# Patient Record
Sex: FEMALE | Race: WHITE | Employment: OTHER | URBAN - METROPOLITAN AREA
[De-identification: names, ages, dates, MRNs, and addresses within clinical notes are randomized per-mention and may not be internally consistent; named-entity substitution may affect disease eponyms.]

---

## 2017-05-17 PROCEDURE — 81003 URINALYSIS AUTO W/O SCOPE: CPT | Performed by: FAMILY MEDICINE

## 2017-10-04 ENCOUNTER — IMMUNIZATION (OUTPATIENT)
Dept: FAMILY MEDICINE CLINIC | Facility: CLINIC | Age: 62
End: 2017-10-04

## 2017-10-04 DIAGNOSIS — Z23 NEED FOR VACCINATION: ICD-10-CM

## 2017-10-04 PROCEDURE — 90471 IMMUNIZATION ADMIN: CPT | Performed by: NURSE PRACTITIONER

## 2017-10-04 PROCEDURE — 90686 IIV4 VACC NO PRSV 0.5 ML IM: CPT | Performed by: NURSE PRACTITIONER

## 2018-07-07 PROCEDURE — 81003 URINALYSIS AUTO W/O SCOPE: CPT | Performed by: FAMILY MEDICINE

## 2018-07-11 PROCEDURE — 88175 CYTOPATH C/V AUTO FLUID REDO: CPT | Performed by: FAMILY MEDICINE

## 2018-07-11 PROCEDURE — 87624 HPV HI-RISK TYP POOLED RSLT: CPT | Performed by: FAMILY MEDICINE

## 2019-03-30 ENCOUNTER — NURSE ONLY (OUTPATIENT)
Dept: FAMILY MEDICINE CLINIC | Facility: CLINIC | Age: 64
End: 2019-03-30
Payer: COMMERCIAL

## 2019-03-30 DIAGNOSIS — Z23 ENCOUNTER FOR IMMUNIZATION: Primary | ICD-10-CM

## 2019-03-30 PROCEDURE — 90715 TDAP VACCINE 7 YRS/> IM: CPT | Performed by: PHYSICIAN ASSISTANT

## 2019-03-30 PROCEDURE — 90471 IMMUNIZATION ADMIN: CPT | Performed by: PHYSICIAN ASSISTANT

## 2019-06-12 PROCEDURE — 86803 HEPATITIS C AB TEST: CPT | Performed by: FAMILY MEDICINE

## 2019-06-12 PROCEDURE — 36415 COLL VENOUS BLD VENIPUNCTURE: CPT | Performed by: FAMILY MEDICINE

## 2020-06-24 PROCEDURE — 88321 CONSLTJ&REPRT SLD PREP ELSWR: CPT | Performed by: INTERNAL MEDICINE

## 2020-06-24 PROCEDURE — 88305 TISSUE EXAM BY PATHOLOGIST: CPT | Performed by: INTERNAL MEDICINE

## 2020-10-30 PROBLEM — Z20.822 EXPOSURE TO COVID-19 VIRUS: Status: ACTIVE | Noted: 2020-10-30

## 2020-10-30 PROBLEM — Z11.3 ENCNTR SCREEN FOR INFECTIONS W SEXL MODE OF TRANSMISS: Status: ACTIVE | Noted: 2020-10-30

## 2020-11-04 ENCOUNTER — OFFICE VISIT (OUTPATIENT)
Dept: FAMILY MEDICINE CLINIC | Facility: CLINIC | Age: 65
End: 2020-11-04
Payer: MEDICARE

## 2020-11-04 VITALS
RESPIRATION RATE: 14 BRPM | DIASTOLIC BLOOD PRESSURE: 82 MMHG | HEART RATE: 62 BPM | TEMPERATURE: 98 F | BODY MASS INDEX: 20.49 KG/M2 | HEIGHT: 64 IN | SYSTOLIC BLOOD PRESSURE: 132 MMHG | WEIGHT: 120 LBS | OXYGEN SATURATION: 98 %

## 2020-11-04 DIAGNOSIS — Z20.822 CLOSE EXPOSURE TO COVID-19 VIRUS: ICD-10-CM

## 2020-11-04 DIAGNOSIS — R09.81 CONGESTION OF NASAL SINUS: Primary | ICD-10-CM

## 2020-11-04 PROCEDURE — 99213 OFFICE O/P EST LOW 20 MIN: CPT | Performed by: NURSE PRACTITIONER

## 2020-11-04 NOTE — PROGRESS NOTES
CHIEF COMPLAINT:   Patient presents with:  Covid: exposed 5 days ago, sinus, congested and sore throat. HPI:   Lakhwinder Haynes is a 72year old female who presents for upper respiratory symptoms for 5 days.    Patient reports: sinus symptoms, conge Smokeless tobacco: Never Used    Alcohol use: Yes      Comment: 1 WINE WEEK    Drug use: No        REVIEW OF SYSTEMS:   GENERAL: OK appetite  SKIN: see HPI  HEENT: See HPI  LUNGS: See HPI  CARDIOVASCULAR: denies palpitations; see HPI  GI: see HPI  NEUR Pt with known COVID exposure; advised a negative COVID test does not guarantee pt is not infectious or contagious and does not change need to quarantine for 14 days after last exposure.        Meds & Refills for this Visit:  Requested Prescriptions      No Patients with pending COVID-19 test results should follow all care and home isolation instructions. Your test results will be called to you from an [facility] representative.  If you have not received a call within 2 business days, please call your pr If you are awaiting test results or are confirmed positive for COVID -19, and your symptoms worsen at home with symptoms such as: extreme weakness, difficult breathing, or unrelenting fevers greater than 100.4 degrees Fahrenheit, you should contact your he [facility], in conjunction with [facility], is looking for patients who have recovered from COVID-19 and would be interested in donating plasma.     Convalescent plasma is a component of blood that, in people who have recovered from COVID-19, [email or web address]://www.hoskins.com/  https://www.cdc.gov/coronavirus/2019-ncov/        The patient indicates understanding of these issues and agrees to the plan.

## 2020-11-04 NOTE — PATIENT INSTRUCTIONS
Covid test sent;   Results 2-3 days    Continue to 14 days quarantine     Follow-up if not improving          Coronavirus Disease 2019 (COVID-19)     [facility] is committed to the safety and well-being of our patients, members, employees, and c symptoms get worse, call your healthcare provider immediately. 3. Get rest and stay hydrated.    4. If you have a medical appointment, call the healthcare provider ahead of time and tell them that you have or may have COVID-19.  5. For medical emergencies, medications; and  · Improvement in respiratory symptoms (e.g., cough, shortness of breath); and  · At least 10 days have passed since symptoms first appeared OR if asymptomatic patient or date of symptom onset is unclear then use 10 days post COVID test da must:    · Have had a confirmed diagnosis of COVID-19  · Be symptom-free for at least 14 days*    *Some people will be required to have a repeat COVID-19 test in order to be eligible to donate.  If you’re instructed by Floyd Castaneda that a repeat test is required

## 2021-02-17 PROBLEM — Z11.3 ENCNTR SCREEN FOR INFECTIONS W SEXL MODE OF TRANSMISS: Status: RESOLVED | Noted: 2020-10-30 | Resolved: 2021-02-17

## 2021-02-17 PROBLEM — Z20.822 EXPOSURE TO COVID-19 VIRUS: Status: RESOLVED | Noted: 2020-10-30 | Resolved: 2021-02-17

## 2021-10-12 ENCOUNTER — HOSPITAL ENCOUNTER (INPATIENT)
Facility: HOSPITAL | Age: 66
LOS: 1 days | Discharge: HOME | DRG: 603 | End: 2021-10-14
Attending: EMERGENCY MEDICINE | Admitting: INTERNAL MEDICINE
Payer: MEDICARE

## 2021-10-12 ENCOUNTER — APPOINTMENT (OUTPATIENT)
Dept: GENERAL RADIOLOGY | Facility: HOSPITAL | Age: 66
DRG: 603 | End: 2021-10-12
Attending: EMERGENCY MEDICINE
Payer: MEDICARE

## 2021-10-12 DIAGNOSIS — L03.113 CELLULITIS OF RIGHT UPPER EXTREMITY: Primary | ICD-10-CM
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
## 2021-10-12 PROCEDURE — 73130 X-RAY EXAM OF HAND: CPT | Performed by: EMERGENCY MEDICINE

## 2021-10-12 PROCEDURE — 99285 EMERGENCY DEPT VISIT HI MDM: CPT

## 2021-10-12 PROCEDURE — 36415 COLL VENOUS BLD VENIPUNCTURE: CPT

## 2021-10-13 PROBLEM — L03.91 ACUTE LYMPHANGITIS: Status: ACTIVE | Noted: 2021-10-13
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
## 2021-10-13 PROCEDURE — 80053 COMPREHEN METABOLIC PANEL: CPT | Performed by: EMERGENCY MEDICINE

## 2021-10-13 PROCEDURE — 85025 COMPLETE CBC W/AUTO DIFF WBC: CPT | Performed by: EMERGENCY MEDICINE

## 2021-10-13 PROCEDURE — 85025 COMPLETE CBC W/AUTO DIFF WBC: CPT | Performed by: HOSPITALIST

## 2021-10-13 PROCEDURE — 84132 ASSAY OF SERUM POTASSIUM: CPT | Performed by: INTERNAL MEDICINE

## 2021-10-13 PROCEDURE — 84145 PROCALCITONIN (PCT): CPT | Performed by: HOSPITALIST

## 2021-10-13 PROCEDURE — 85027 COMPLETE CBC AUTOMATED: CPT | Performed by: HOSPITALIST

## 2021-10-13 PROCEDURE — 80048 BASIC METABOLIC PNL TOTAL CA: CPT | Performed by: EMERGENCY MEDICINE

## 2021-10-13 PROCEDURE — 85007 BL SMEAR W/DIFF WBC COUNT: CPT | Performed by: HOSPITALIST

## 2021-10-13 PROCEDURE — 87040 BLOOD CULTURE FOR BACTERIA: CPT | Performed by: EMERGENCY MEDICINE

## 2021-10-13 PROCEDURE — S0077 INJECTION, CLINDAMYCIN PHOSP: HCPCS | Performed by: HOSPITALIST

## 2021-10-13 PROCEDURE — 85027 COMPLETE CBC AUTOMATED: CPT | Performed by: EMERGENCY MEDICINE

## 2021-10-13 PROCEDURE — 85007 BL SMEAR W/DIFF WBC COUNT: CPT | Performed by: EMERGENCY MEDICINE

## 2021-10-13 RX ORDER — BISACODYL 10 MG
10 SUPPOSITORY, RECTAL RECTAL
Status: DISCONTINUED | OUTPATIENT
Start: 2021-10-13 | End: 2021-10-14

## 2021-10-13 RX ORDER — ENOXAPARIN SODIUM 100 MG/ML
40 INJECTION SUBCUTANEOUS DAILY
Status: DISCONTINUED | OUTPATIENT
Start: 2021-10-13 | End: 2021-10-14

## 2021-10-13 RX ORDER — SODIUM PHOSPHATE, DIBASIC AND SODIUM PHOSPHATE, MONOBASIC 7; 19 G/133ML; G/133ML
1 ENEMA RECTAL ONCE AS NEEDED
Status: DISCONTINUED | OUTPATIENT
Start: 2021-10-13 | End: 2021-10-14

## 2021-10-13 RX ORDER — POLYETHYLENE GLYCOL 3350 17 G/17G
17 POWDER, FOR SOLUTION ORAL DAILY PRN
Status: DISCONTINUED | OUTPATIENT
Start: 2021-10-13 | End: 2021-10-14

## 2021-10-13 RX ORDER — ACETAMINOPHEN 325 MG/1
650 TABLET ORAL EVERY 6 HOURS PRN
Status: DISCONTINUED | OUTPATIENT
Start: 2021-10-13 | End: 2021-10-14

## 2021-10-13 RX ORDER — CLINDAMYCIN PHOSPHATE 600 MG/50ML
600 INJECTION INTRAVENOUS EVERY 8 HOURS
Status: DISCONTINUED | OUTPATIENT
Start: 2021-10-13 | End: 2021-10-13

## 2021-10-13 RX ORDER — POTASSIUM CHLORIDE 20 MEQ/1
40 TABLET, EXTENDED RELEASE ORAL EVERY 4 HOURS
Status: COMPLETED | OUTPATIENT
Start: 2021-10-13 | End: 2021-10-13

## 2021-10-13 RX ORDER — PANTOPRAZOLE SODIUM 40 MG/1
40 TABLET, DELAYED RELEASE ORAL
Status: DISCONTINUED | OUTPATIENT
Start: 2021-10-13 | End: 2021-10-14

## 2021-10-13 RX ORDER — METOCLOPRAMIDE HYDROCHLORIDE 5 MG/ML
10 INJECTION INTRAMUSCULAR; INTRAVENOUS EVERY 8 HOURS PRN
Status: DISCONTINUED | OUTPATIENT
Start: 2021-10-13 | End: 2021-10-14

## 2021-10-13 RX ORDER — SODIUM CHLORIDE 9 MG/ML
INJECTION, SOLUTION INTRAVENOUS CONTINUOUS
Status: DISCONTINUED | OUTPATIENT
Start: 2021-10-13 | End: 2021-10-14

## 2021-10-13 RX ORDER — DOCUSATE SODIUM 100 MG/1
100 CAPSULE, LIQUID FILLED ORAL 2 TIMES DAILY
Status: DISCONTINUED | OUTPATIENT
Start: 2021-10-13 | End: 2021-10-14

## 2021-10-13 RX ORDER — ACETAMINOPHEN 325 MG/1
650 TABLET ORAL EVERY 4 HOURS PRN
Status: DISCONTINUED | OUTPATIENT
Start: 2021-10-13 | End: 2021-10-14

## 2021-10-13 RX ORDER — ONDANSETRON 2 MG/ML
4 INJECTION INTRAMUSCULAR; INTRAVENOUS EVERY 6 HOURS PRN
Status: DISCONTINUED | OUTPATIENT
Start: 2021-10-13 | End: 2021-10-14

## 2021-10-13 RX ORDER — HYDROCODONE BITARTRATE AND ACETAMINOPHEN 5; 325 MG/1; MG/1
2 TABLET ORAL EVERY 4 HOURS PRN
Status: DISCONTINUED | OUTPATIENT
Start: 2021-10-13 | End: 2021-10-14

## 2021-10-13 RX ORDER — MELATONIN
3 NIGHTLY PRN
Status: DISCONTINUED | OUTPATIENT
Start: 2021-10-13 | End: 2021-10-14

## 2021-10-13 RX ORDER — HYDROCODONE BITARTRATE AND ACETAMINOPHEN 5; 325 MG/1; MG/1
1 TABLET ORAL EVERY 4 HOURS PRN
Status: DISCONTINUED | OUTPATIENT
Start: 2021-10-13 | End: 2021-10-14

## 2021-10-13 NOTE — PLAN OF CARE
Patient alert and oriented , up ad em in room . Right hand red and swollen warm to touch ,redness is going up to the upper arm ,patient states she can feel the pain in her lymph node . Small open area to the rt hand covered with dry gauze .  Ivf started ,i

## 2021-10-13 NOTE — ED INITIAL ASSESSMENT (HPI)
Pt was scratched by a cat yesterday in her right arm around late afternoon. Pt arrives today with a red and swollen right forearm and wrist. Pt was seen at the urgent care who sent her here.

## 2021-10-13 NOTE — PLAN OF CARE
Right hand/arm remains red and swollen. Arm elevated on pillow, on iv antibiotic. Rating pain to hand 4 out of 10 and headache 6, norco given. Patient states she normally drinks 2-3 cups of tea per day, possible caffeine withdrawal headache?  Patient given

## 2021-10-13 NOTE — H&P
[facility] Hospitalist History and Physical      Patient presents with:  Cellulitis       PCP: Erum Montalvo DO      History of Present Illness: Patient is a 77year old female with PMH sig for Kearns's esophagus who presented to the ED last ni Cap, Take 5,000 Units by mouth daily. , Disp: , Rfl:   Calcium Carbonate 600 MG Oral Tab, 600 mg daily. , Disp: , Rfl:   Probiotic Product (PROBIOTIC DAILY OR), Take by mouth., Disp: , Rfl:   ALENDRONATE 70 MG Oral Tab, TAKE 1 TABLET BY MOUTH ONCE A WEEK, Hanh Dent ALT 23 10/13/2021       CXR: image personally reviewed. Radiology: XR HAND (MIN 3 VIEWS), RIGHT (CPT=73130)    Result Date: 10/13/2021  PROCEDURE:  XR HAND (MIN 3 VIEWS), RIGHT (CPT=73130)  TECHNIQUE:  Three views were obtained. COMPARISON:  None. on patients current state of illness, I anticipate that, after discharge, patient will require TBD.

## 2021-10-13 NOTE — CONSULTS
INFECTIOUS DISEASE CONSULT NOTE    José Miguel Henry Patient Status:  Inpatient    1955 MRN [id number]   [facility] 3SW-A Attending Delia Bergman,    Hosp Day # 0 PCP [facility] Subcutaneous, Daily  •  acetaminophen (TYLENOL) tab 650 mg, 650 mg, Oral, Q6H PRN  •  acetaminophen (TYLENOL) tab 650 mg, 650 mg, Oral, Q4H PRN **OR** HYDROcodone-acetaminophen (NORCO) 5-325 MG per tab 1 tablet, 1 tablet, Oral, Q4H PRN **OR** HYDROcodone-a temperature 98.9 °F (37.2 °C), temperature source Oral, resp. rate 18, height 5' 4\" (1.626 m), weight 125 lb (56.7 kg), last menstrual period 01/01/1995, SpO2 96 %. HEENT: no scleral icterus or conjunctival injection. Moist mucous membranes.  No thrush  N antibiotics at immediate care and sent for admission. PATIENT STATED HISTORY: (As transcribed by Technologist)  Patient states she is a  and was scratched by a cat yesterday to the right hand and today hand is very red and swollen.     FINDINGS

## 2021-10-13 NOTE — PLAN OF CARE
K+ 3.6 this morning, I will replace per electrolyte protocol and K+ blood test to recheck will be at 17:30.

## 2021-10-14 VITALS
HEIGHT: 64 IN | SYSTOLIC BLOOD PRESSURE: 108 MMHG | OXYGEN SATURATION: 96 % | TEMPERATURE: 99 F | DIASTOLIC BLOOD PRESSURE: 71 MMHG | WEIGHT: 125 LBS | HEART RATE: 79 BPM | RESPIRATION RATE: 18 BRPM | BODY MASS INDEX: 21.34 KG/M2

## 2021-10-14 PROCEDURE — 85025 COMPLETE CBC W/AUTO DIFF WBC: CPT | Performed by: INTERNAL MEDICINE

## 2021-10-14 PROCEDURE — 80048 BASIC METABOLIC PNL TOTAL CA: CPT | Performed by: INTERNAL MEDICINE

## 2021-10-14 RX ORDER — AMOXICILLIN AND CLAVULANATE POTASSIUM 875; 125 MG/1; MG/1
1 TABLET, FILM COATED ORAL 2 TIMES DAILY
Qty: 20 TABLET | Refills: 0 | Status: SHIPPED | OUTPATIENT
Start: 2021-10-14 | End: 2021-10-24

## 2021-10-14 RX ORDER — POTASSIUM CHLORIDE 20 MEQ/1
40 TABLET, EXTENDED RELEASE ORAL ONCE
Status: COMPLETED | OUTPATIENT
Start: 2021-10-14 | End: 2021-10-14

## 2021-10-14 NOTE — PROGRESS NOTES
INFECTIOUS DISEASE PROGRESS NOTE    Lety Carrion Patient Status:  Inpatient    1955 MRN [id number]   [facility] 3SW-A Attending Sid Aguilar,    Hosp Day # 1 PCP [facility] temperature source Oral, resp. rate 18, height 5' 4\" (1.626 m), weight 125 lb (56.7 kg), last menstrual period 01/01/1995, SpO2 96 %. HEENT: no scleral icterus or conjunctival injection. Moist mucous membranes. No thrush  Neck: No lymphadenopathy.   Suppl UROBILINOGEN, NITRITE, LEUUR, WBCUR, RBCUR, BACUR, EPIUR in the last 168 hours.      Microbiology    Reviewed in EMR,     Radiology: XR HAND (MIN 3 VIEWS), RIGHT (CPT=73130)    Result Date: 10/13/2021  PROCEDURE:  XR HAND (MIN 3 VIEWS), RIGHT (CPT=73130)  T progress  -doubt deep infection or tendon involvement based on changes on exam. Would not consult ortho at this point. Case and plan d/w pt and RN.  Would dc on augmentin for 10 more days    Cornelius Alicea MD

## 2021-10-14 NOTE — DISCHARGE SUMMARY
[facility] Internal Medicine Hospitalist Discharge Summary     Patient ID:  Chuck Morfin  77year old  2/6/1955    Admit date: 10/12/2021    Discharge date and time: 10/14/2021    Attending Units by mouth daily. Calcium Carbonate 600 MG Oral Tab  600 mg daily. Probiotic Product (PROBIOTIC DAILY OR)  Take by mouth.     ALENDRONATE 70 MG Oral Tab  TAKE 1 TABLET BY MOUTH ONCE A WEEK          Activity: activity as tolerated  Diet: regular di

## 2021-10-14 NOTE — ED PROVIDER NOTES
Patient Seen in: [facility] 3sw-a      History   Patient presents with:  Cellulitis    Stated Complaint: cat scratch to right hand, swollen and red.  Had IV antibiotics at immediate car*    Subjective:   HPI    30-year-old female presents from the im antibiotics at immediate car*  Other systems are as noted in HPI. Constitutional and vital signs reviewed. All other systems reviewed and negative except as noted above.     Physical Exam     ED Triage Vitals [10/12/21 2219]   /65   Pulse 95   R following components:    Glucose 119 (*)     Sodium 133 (*)     Calcium, Total 7.1 (*)     Alkaline Phosphatase 47 (*)     Total Protein 5.9 (*)     Albumin 2.8 (*)     A/G Ratio 0.9 (*)     All other components within normal limits   PROCALCITONIN - Abnor -----------         ------                     CBC W/ DIFFERENTIAL[[id number]]          Abnormal            Final result                 Please view results for these tests on the individual orders.    SCAN SLIDE   CBC WITH DIFFERENTIAL W treatment.   Admission disposition: 10/12/2021 11:47 PM                                  Disposition and Plan     Clinical Impression:  Cellulitis of right upper extremity  (primary encounter diagnosis)  Cat scratch     Disposition:  Admit  10/12/2021 11:47

## 2021-10-14 NOTE — PLAN OF CARE
Patient alert, O x 4. Right hand appears slightly less red than yesterday, swelling remains the same. Has low grade temp 99.4, IS encouraged. On iv antibiotics. Patient removed gauze wrap to right hand, no drainage now, coverlet applied.

## 2021-10-14 NOTE — PLAN OF CARE
Gauze/kerlix dsg to rt hand cdi. Swelling and redness remains to hand, arm kept elevated. States has throbbing pain at times, declined need for pain medication when offered. Afebrile, tolerating Unasyn IV. DC home when ready.

## 2021-10-14 NOTE — PLAN OF CARE
Patient received full dose of antibiotic iv. Written and verbal discharge instructions given to patient. She verbalizes understanding. Patient discharged home via wheelchair with friend in stable condition.

## 2021-10-27 PROBLEM — M81.0 OSTEOPOROSIS: Status: ACTIVE | Noted: 2020-02-20

## 2023-10-23 RX ORDER — FAMOTIDINE 20 MG/1
20 TABLET, FILM COATED ORAL DAILY
COMMUNITY

## 2023-11-14 ENCOUNTER — ANESTHESIA EVENT (OUTPATIENT)
Dept: ENDOSCOPY | Facility: HOSPITAL | Age: 68
End: 2023-11-14
Payer: MEDICARE

## 2023-11-15 ENCOUNTER — HOSPITAL ENCOUNTER (OUTPATIENT)
Facility: HOSPITAL | Age: 68
Setting detail: HOSPITAL OUTPATIENT SURGERY
Discharge: HOME | End: 2023-11-15
Attending: INTERNAL MEDICINE | Admitting: INTERNAL MEDICINE
Payer: MEDICARE

## 2023-11-15 ENCOUNTER — ANESTHESIA (OUTPATIENT)
Dept: ENDOSCOPY | Facility: HOSPITAL | Age: 68
End: 2023-11-15
Payer: MEDICARE

## 2023-11-15 VITALS
HEART RATE: 69 BPM | BODY MASS INDEX: 21.87 KG/M2 | DIASTOLIC BLOOD PRESSURE: 73 MMHG | RESPIRATION RATE: 16 BRPM | HEIGHT: 63.5 IN | OXYGEN SATURATION: 99 % | WEIGHT: 125 LBS | SYSTOLIC BLOOD PRESSURE: 119 MMHG | TEMPERATURE: 98 F

## 2023-11-15 PROCEDURE — 0D538ZZ DESTRUCTION OF LOWER ESOPHAGUS, VIA NATURAL OR ARTIFICIAL OPENING ENDOSCOPIC: ICD-10-PCS | Performed by: INTERNAL MEDICINE

## 2023-11-15 RX ORDER — SODIUM CHLORIDE, SODIUM LACTATE, POTASSIUM CHLORIDE, CALCIUM CHLORIDE 600; 310; 30; 20 MG/100ML; MG/100ML; MG/100ML; MG/100ML
INJECTION, SOLUTION INTRAVENOUS CONTINUOUS
Status: DISCONTINUED | OUTPATIENT
Start: 2023-11-15 | End: 2023-11-15

## 2023-11-15 RX ORDER — NALOXONE HYDROCHLORIDE 0.4 MG/ML
0.08 INJECTION, SOLUTION INTRAMUSCULAR; INTRAVENOUS; SUBCUTANEOUS ONCE AS NEEDED
Status: DISCONTINUED | OUTPATIENT
Start: 2023-11-15 | End: 2023-11-15

## 2023-11-15 RX ORDER — OMEPRAZOLE 20 MG/1
20 CAPSULE, DELAYED RELEASE ORAL
COMMUNITY

## 2023-11-15 RX ADMIN — SODIUM CHLORIDE, SODIUM LACTATE, POTASSIUM CHLORIDE, CALCIUM CHLORIDE: 600; 310; 30; 20 INJECTION, SOLUTION INTRAVENOUS at 11:00:00

## 2023-11-15 RX ADMIN — SODIUM CHLORIDE, SODIUM LACTATE, POTASSIUM CHLORIDE, CALCIUM CHLORIDE: 600; 310; 30; 20 INJECTION, SOLUTION INTRAVENOUS at 10:40:00

## 2023-11-15 NOTE — OPERATIVE REPORT
OPERATIVE REPORT   PATIENT NAME: Derrick Dewey  MRN: [id number]  DATE OF OPERATION: 11/15/2023  PREOPERATIVE DIAGNOSIS: Esquivel's esophagus with low grade dysplasia; chronic GERD  POSTOPERATIVE DIAGNOSIS:    1. Irregular Z line with small < 1cm tongues of Esquivel's at 6 o'clock and 3 o'clock position   2. Small 2cm Hill type II hiatal hernia   PROCEDURE PERFORMED: Esophagogastroduodenoscopy with david RFA #1/3  SEDATION MEDICATIONS: MAC  MODERATE SEDATION TIME: None. Deep sedation provided by anesthesia  PREPROCEDURE ASSESSMENT: The indication for this procedure is to assess for Esquivel's and RFA. The patient was identified by myself and nursing staff in the exam room. Informed consent was obtained. The patient was seen in clinic and a full H&P was obtained. On brief physical examination, airway is patent. Chest is clear. Heart has regular rate and rhythm. Abdomen is soft, nontender with good bowel sounds. A medication list was taken by nursing today and reviewed by myself. The patient is an ASA grade 2. PROCEDURE NOTE: The procedure was completed without difficulty. The patient tolerated the procedure well. The endoscope was inserted through the mouth and advanced to the level of the duodenum, 3rd portion. Esophagus appeared normal without stricture or esophagitis. Small hiatal hernia was noted. Irregular Z line with small tongues noted. Circumferential RFA with channel catheter was performed x 2 sessions. Good results were noted. Stomach was normal in the antrum and the body. Duodenum was normal in the bulb and 2nd duodenum. Retroflexed view in the stomach revealed normal fundus and cardia. There were no immediate complications. FINDINGS   Ultra-short esquivel's with LGD. S/p RFA x 1 of 3  RECOMMENDATIONS: repeat EGD in 2 months x 2 and then repeat EGD 3 months later for biopsies. Continue daily PPI. Carafate sent.     DISCHARGE:  On discharge, the patient was given an after-visit summary detailing the procedure, findings, followup plans, and an updated medication list.     Thank you very much for the consultation. I really appreciate it.     Bethanie Avila MD

## 2023-11-15 NOTE — DISCHARGE INSTRUCTIONS
Home Care Instructions for Gastroscopy with Sedation    Diet:  - Resume your regular diet as tolerated unless otherwise instructed. - Start with light meals to minimize bloating.  - Do not drink alcohol today. Medication:  - If you have questions about resuming your normal medications, please contact your Primary Care Physician. Activities:  - Take it easy today. Do not return to work today. - Do not drive today. - Do not operate any machinery today (including kitchen equipment). Gastroscopy:  - You may have a sore throat for 2-3 days following the exam. This is normal. Gargling with warm salt water (1/2 tsp salt to 1 glass warm water) or using throat lozenges will help. - If you experience any sharp pain in your neck, abdomen or chest, vomiting of blood, oral temperature over 100 degrees Fahrenheit, light-headedness or dizziness, or any other problems, contact your doctor. **If unable to reach your doctor, please go to the [facility] Emergency Room**    - Your referring physician will receive a full report of your examination.  - If you do not hear from your doctor's office within two weeks of your biopsy, please call them for your results. You may be able to see your laboratory results in [facility] between 4 and 7 business days. In some cases, your physician may not have viewed the results before they are released to [address] If you have questions regarding your results contact the physician who ordered the test/exam by phone or via [address] by choosing \"Ask a Medical Question. \"

## 2023-11-15 NOTE — ANESTHESIA POSTPROCEDURE EVALUATION
Sonia Maxwell 89 Patient Status:  Hospital Outpatient Surgery   Age/Gender 76year old female MRN [id number]   Location [address] [address] Attending Beverley Diop MD   Hosp Day # 0 PCP Aniket Raman DO       Anesthesia Post-op Note    ESOPHAGOGASTRODUODENOSCOPY (EGD) CHANNEL RADIO FREQUENCY ABLATION    Procedure Summary       Date: 11/15/23 Room / Location: [facility] ENDOSCOPY 03 / [facility] ENDOSCOPY    Anesthesia Start: 2951 Anesthesia Stop:     Procedure: ESOPHAGOGASTRODUODENOSCOPY (EGD) CHANNEL RADIO FREQUENCY ABLATION Diagnosis: (Kearns's, hiatal hernia)    Surgeons: Beverley Diop MD Anesthesiologist: Karl Richardson MD    Anesthesia Type: MAC ASA Status: 2            Anesthesia Type: MAC    Vitals Value Taken Time   /63 11/15/23 1100   Temp 98 11/15/23 1100   Pulse 73 11/15/23 1100   Resp 17 11/15/23 1100   SpO2 99 11/15/23 1100       Patient Location: Endoscopy    Anesthesia Type: MAC    Airway Patency: patent    Postop Pain Control: adequate    Mental Status: mildly sedated but able to meaningfully participate in the post-anesthesia evaluation    Nausea/Vomiting: none    Cardiopulmonary/Hydration status: stable euvolemic    Complications: no apparent anesthesia related complications    Postop vital signs: stable    Dental Exam: Unchanged from Preop    Patient to be discharged home when criteria met.

## 2024-01-10 ENCOUNTER — HOSPITAL ENCOUNTER (OUTPATIENT)
Facility: HOSPITAL | Age: 69
Setting detail: HOSPITAL OUTPATIENT SURGERY
Discharge: HOME | End: 2024-01-10
Attending: INTERNAL MEDICINE | Admitting: INTERNAL MEDICINE
Payer: MEDICARE

## 2024-01-10 ENCOUNTER — ANESTHESIA (OUTPATIENT)
Dept: ENDOSCOPY | Facility: HOSPITAL | Age: 69
End: 2024-01-10
Payer: MEDICARE

## 2024-01-10 ENCOUNTER — ANESTHESIA EVENT (OUTPATIENT)
Dept: ENDOSCOPY | Facility: HOSPITAL | Age: 69
End: 2024-01-10
Payer: MEDICARE

## 2024-01-10 VITALS
WEIGHT: 125 LBS | RESPIRATION RATE: 16 BRPM | OXYGEN SATURATION: 97 % | BODY MASS INDEX: 22.15 KG/M2 | DIASTOLIC BLOOD PRESSURE: 80 MMHG | HEIGHT: 63 IN | TEMPERATURE: 97 F | SYSTOLIC BLOOD PRESSURE: 119 MMHG | HEART RATE: 63 BPM

## 2024-01-10 PROCEDURE — 0D538ZZ DESTRUCTION OF LOWER ESOPHAGUS, VIA NATURAL OR ARTIFICIAL OPENING ENDOSCOPIC: ICD-10-PCS | Performed by: INTERNAL MEDICINE

## 2024-01-10 RX ORDER — SODIUM CHLORIDE, SODIUM LACTATE, POTASSIUM CHLORIDE, CALCIUM CHLORIDE 600; 310; 30; 20 MG/100ML; MG/100ML; MG/100ML; MG/100ML
INJECTION, SOLUTION INTRAVENOUS CONTINUOUS
Status: DISCONTINUED | OUTPATIENT
Start: 2024-01-10 | End: 2024-01-10

## 2024-01-10 RX ORDER — ONDANSETRON 2 MG/ML
4 INJECTION INTRAMUSCULAR; INTRAVENOUS ONCE AS NEEDED
Status: DISCONTINUED | OUTPATIENT
Start: 2024-01-10 | End: 2024-01-10

## 2024-01-10 RX ORDER — NALOXONE HYDROCHLORIDE 0.4 MG/ML
0.08 INJECTION, SOLUTION INTRAMUSCULAR; INTRAVENOUS; SUBCUTANEOUS ONCE AS NEEDED
Status: DISCONTINUED | OUTPATIENT
Start: 2024-01-10 | End: 2024-01-10

## 2024-01-10 RX ADMIN — SODIUM CHLORIDE, SODIUM LACTATE, POTASSIUM CHLORIDE, CALCIUM CHLORIDE: 600; 310; 30; 20 INJECTION, SOLUTION INTRAVENOUS at 07:19:00

## 2024-01-10 RX ADMIN — SODIUM CHLORIDE, SODIUM LACTATE, POTASSIUM CHLORIDE, CALCIUM CHLORIDE: 600; 310; 30; 20 INJECTION, SOLUTION INTRAVENOUS at 07:18:00

## 2024-01-10 NOTE — OPERATIVE REPORT
OPERATIVE REPORT   PATIENT NAME: Meagan Nolen  MRN: [id number]  DATE OF OPERATION: 1/10/2024  PREOPERATIVE DIAGNOSIS: Esquivel's esophagus with low grade dysplasia; chronic GERD  POSTOPERATIVE DIAGNOSIS:                1.  Irregular Z line with small < 1cm tongue of Esquivel's at 12 o'clock                2.  Small 2cm Hill type II hiatal hernia    3.  Mild erosive esophagitis  PROCEDURE PERFORMED: Esophagogastroduodenoscopy with david RFA #2/3  SEDATION MEDICATIONS: MAC  MODERATE SEDATION TIME: None.  Deep sedation provided by anesthesia  PREPROCEDURE ASSESSMENT: The indication for this procedure is to assess for Esquivel's and RFA. The patient was identified by myself and nursing staff in the exam room. Informed consent was obtained. The patient was seen in clinic and a full H&P was obtained. On brief physical examination, airway is patent. Chest is clear. Heart has regular rate and rhythm. Abdomen is soft, nontender with good bowel sounds. A medication list was taken by nursing today and reviewed by myself. The patient is an ASA grade 2.   PROCEDURE NOTE: The procedure was completed without difficulty. The patient tolerated the procedure well. The endoscope was inserted through the mouth and advanced to the level of the duodenum, 3rd portion.  Esophagus appeared normal without stricture but small erosion was noted. Small hiatal hernia was noted. Irregular Z line with small tongue noted. Circumferential RFA with channel catheter was performed x 2 sessions.  Good results were noted.  Stomach was normal in the antrum and the body.  Duodenum was normal in the bulb and 2nd duodenum.  Retroflexed view in the stomach revealed normal fundus and cardia.  There were no immediate complications.   FINDINGS   Ultra-short esquivel's with LGD.  S/p RFA x 2 of 3  RECOMMENDATIONS: repeat EGD in 2 months continue PPI  DISCHARGE:  On discharge, the patient was given an after-visit summary detailing the procedure, findings,  followup plans, and an updated medication list.      Thank you very much for the consultation.  I really appreciate it.     Florencio Rios MD

## 2024-01-10 NOTE — ANESTHESIA POSTPROCEDURE EVALUATION
[facility]    Meagan Nolen Patient Status:  Hospital Outpatient Surgery   Age/Gender 68 year old female MRN [id number]   Location [facility] ENDOSCOPY PAIN CENTER Attending Florencio Rios MD   Hosp Day # 0 PCP Fransisco Esquivel DO       Anesthesia Post-op Note    ESOPHAGOGASTRODUODENOSCOPY (EGD) WITH RADIO FREQUENCY ABLATION    Procedure Summary       Date: 01/10/24 Room / Location:  ENDOSCOPY 03 / [facility] ENDOSCOPY    Anesthesia Start: 0718 Anesthesia Stop: 0739    Procedure: ESOPHAGOGASTRODUODENOSCOPY (EGD) WITH RADIO FREQUENCY ABLATION Diagnosis: (BARRETTS ESOPHAGUS WITH LOVW GRADE DYSPLASIA)    Surgeons: Florencio Rios MD Anesthesiologist: Bryant Cruz MD    Anesthesia Type: MAC ASA Status: 2            Anesthesia Type: MAC    Vitals Value Taken Time   /72 01/10/24 0739   Temp  01/10/24 0740   Pulse 71 01/10/24 0739   Resp 18 01/10/24 0740   SpO2 100 % 01/10/24 0739   Vitals shown include unfiled device data.    Patient Location: Endoscopy    Anesthesia Type: MAC    Airway Patency: patent    Postop Pain Control: adequate    Mental Status: mildly sedated but able to meaningfully participate in the post-anesthesia evaluation    Nausea/Vomiting: none    Cardiopulmonary/Hydration status: stable euvolemic    Complications: no apparent anesthesia related complications    Postop vital signs: stable    Dental Exam: Unchanged from Preop    Patient to be discharged home when criteria met.

## 2024-01-10 NOTE — ANESTHESIA PREPROCEDURE EVALUATION
PRE-OP EVALUATION    Patient Name: Meagan Nolen    Admit Diagnosis: BARRETTS ESOPHAGUS WITH LOVW GRADE DYSPLASIA    Pre-op Diagnosis: BARRETTS ESOPHAGUS WITH LOVW GRADE DYSPLASIA    ESOPHAGOGASTRODUODENOSCOPY (EGD) WITH RADIO FREQUENCY ABLATION    Anesthesia Procedure: ESOPHAGOGASTRODUODENOSCOPY (EGD) WITH RADIO FREQUENCY ABLATION    Surgeon(s) and Role:     * Florencio Rios MD - Primary    Pre-op vitals reviewed.  Temp: 96.9 °F (36.1 °C)  Pulse: 68  Resp: 18  BP: 115/79  SpO2: 96 %  Body mass index is 22.14 kg/m².    Current medications reviewed.  Hospital Medications:   lactated ringers infusion   Intravenous Continuous       Outpatient Medications:     Medications Prior to Admission   Medication Sig Dispense Refill Last Dose    omeprazole 20 MG Oral Capsule Delayed Release Take 1 capsule (20 mg total) by mouth every morning before breakfast.   1/9/2024    famotidine 20 MG Oral Tab Take 1 tablet (20 mg total) by mouth daily.   1/9/2024    IRON OR daily.       Cyanocobalamin (VITAMIN B-12 OR) Take by mouth daily.   1/8/2024    cholecalciferol (VITAMIN D3) 125 MCG (5000 UT) Oral Cap Take 1 capsule (5,000 Units total) by mouth daily.       Calcium Carbonate 600 MG Oral Tab 1 tablet (600 mg total) daily.   1/8/2024    Probiotic Product (PROBIOTIC DAILY OR) Take by mouth.   1/8/2024    ALENDRONATE 70 MG Oral Tab TAKE 1 TABLET BY MOUTH 1 TIME A WEEK (Patient taking differently: On Saturdays) 12 tablet 0 1/6/2024       Allergies: Patient has no known allergies.      Anesthesia Evaluation        Anesthetic Complications           GI/Hepatic/Renal      (+) GERD                           Cardiovascular        Exercise tolerance: good     MET: >4                                           Endo/Other    Negative endo/other ROS.                              Pulmonary    Negative pulmonary ROS.                       Neuro/Psych    Negative neuro/psych ROS.                                  Past Surgical History:    Procedure Laterality Date    COLONOSCOPY  '05 repeat in 8-10 yrs    COLONOSCOPY  5/18/05    COLONOSCOPY N/A 12/15/2015    Procedure: COLONOSCOPY;  Surgeon: Spencer Rodriguez MD;  Location:  ENDOSCOPY    COLONOSCOPY  06/24/2020    Repeat in 7 years    COLONOSCOPY & POLYPECTOMY  12/15/15= Hemorrhoids, Polyp (TA)    Repeat 2020    EGD N/A 6/24/2020    Procedure: ESOPHAGOGASTRODUODENOSCOPY, COLONOSCOPY, POSSIBLE BIOPSY, POSSIBLE POLYPECTOMY 66055, 78994;  Surgeon: Seth Alaniz MD;  Location: [facility]    IMPLANT LEFT Left 1998    Saline    IMPLANT RIGHT Right 1998    Saline     Social History     Socioeconomic History    Marital status:    Tobacco Use    Smoking status: Never    Smokeless tobacco: Never   Vaping Use    Vaping Use: Never used   Substance and Sexual Activity    Alcohol use: Yes     Alcohol/week: 4.0 standard drinks of alcohol     Types: 4 Glasses of wine per week    Drug use: No     History   Drug Use No     Available pre-op labs reviewed.               Airway      Mallampati: II  Mouth opening: 3 FB  TM distance: 4 - 6 cm  Neck ROM: full Cardiovascular    Cardiovascular exam normal.         Dental             Pulmonary    Pulmonary exam normal.                 Other findings  Dentition grossly normal.            ASA: 2   Plan: MAC  NPO status verified and patient meets guidelines.          Plan/risks discussed with: patient                Present on Admission:  **None**

## 2024-01-10 NOTE — DISCHARGE INSTRUCTIONS
Home Care Instructions for Gastroscopy with Sedation    Diet:  - Resume your regular diet as tolerated unless otherwise instructed.  - Start with light meals to minimize bloating.  - Do not drink alcohol today.    Medication:  - If you have questions about resuming your normal medications, please contact your Primary Care Physician.    Activities:  - Take it easy today. Do not return to work today.  - Do not drive today.  - Do not operate any machinery today (including kitchen equipment).      Gastroscopy:  - You may have a sore throat for 2-3 days following the exam. This is normal. Gargling with warm salt water (1/2 tsp salt to 1 glass warm water) or using throat lozenges will help.  - If you experience any sharp pain in your neck, abdomen or chest, vomiting of blood, oral temperature over 100 degrees Fahrenheit, light-headedness or dizziness, or any other problems, contact your doctor.    **If unable to reach your doctor, please go to the [facility] Emergency Room**    - Your referring physician will receive a full report of your examination.  - If you do not hear from your doctor's office within two weeks of your biopsy, please call them for your results.    You may be able to see your laboratory results in [employer] between 4 and 7 business days.  In some cases, your physician may not have viewed the results before they are released to [employer].  If you have questions regarding your results contact the physician who ordered the test/exam by phone or via [employer] by choosing \"Ask a Medical Question.\"

## 2024-02-21 ENCOUNTER — APPOINTMENT (OUTPATIENT)
Dept: ADMINISTRATIVE | Facility: HOSPITAL | Age: 69
End: 2024-02-21
Payer: MEDICARE

## 2024-03-12 ENCOUNTER — HOSPITAL ENCOUNTER (OUTPATIENT)
Facility: HOSPITAL | Age: 69
Setting detail: HOSPITAL OUTPATIENT SURGERY
Discharge: HOME | End: 2024-03-12
Attending: INTERNAL MEDICINE | Admitting: INTERNAL MEDICINE
Payer: MEDICARE

## 2024-03-12 ENCOUNTER — ANESTHESIA (OUTPATIENT)
Dept: ENDOSCOPY | Facility: HOSPITAL | Age: 69
End: 2024-03-12
Payer: MEDICARE

## 2024-03-12 ENCOUNTER — ANESTHESIA EVENT (OUTPATIENT)
Dept: ENDOSCOPY | Facility: HOSPITAL | Age: 69
End: 2024-03-12
Payer: MEDICARE

## 2024-03-12 VITALS
WEIGHT: 125 LBS | SYSTOLIC BLOOD PRESSURE: 144 MMHG | OXYGEN SATURATION: 100 % | HEART RATE: 59 BPM | RESPIRATION RATE: 14 BRPM | TEMPERATURE: 97 F | BODY MASS INDEX: 21.87 KG/M2 | HEIGHT: 63.5 IN | DIASTOLIC BLOOD PRESSURE: 81 MMHG

## 2024-03-12 PROCEDURE — 0D558ZZ DESTRUCTION OF ESOPHAGUS, VIA NATURAL OR ARTIFICIAL OPENING ENDOSCOPIC: ICD-10-PCS | Performed by: INTERNAL MEDICINE

## 2024-03-12 RX ORDER — SODIUM CHLORIDE, SODIUM LACTATE, POTASSIUM CHLORIDE, CALCIUM CHLORIDE 600; 310; 30; 20 MG/100ML; MG/100ML; MG/100ML; MG/100ML
INJECTION, SOLUTION INTRAVENOUS CONTINUOUS
Status: DISCONTINUED | OUTPATIENT
Start: 2024-03-12 | End: 2024-03-12

## 2024-03-12 RX ORDER — NALOXONE HYDROCHLORIDE 0.4 MG/ML
0.08 INJECTION, SOLUTION INTRAMUSCULAR; INTRAVENOUS; SUBCUTANEOUS ONCE AS NEEDED
Status: DISCONTINUED | OUTPATIENT
Start: 2024-03-12 | End: 2024-03-12

## 2024-03-12 RX ORDER — LIDOCAINE HYDROCHLORIDE 10 MG/ML
INJECTION, SOLUTION EPIDURAL; INFILTRATION; INTRACAUDAL; PERINEURAL AS NEEDED
Status: DISCONTINUED | OUTPATIENT
Start: 2024-03-12 | End: 2024-03-12 | Stop reason: SURG

## 2024-03-12 RX ADMIN — SODIUM CHLORIDE, SODIUM LACTATE, POTASSIUM CHLORIDE, CALCIUM CHLORIDE: 600; 310; 30; 20 INJECTION, SOLUTION INTRAVENOUS at 13:42:00

## 2024-03-12 RX ADMIN — LIDOCAINE HYDROCHLORIDE 25 MG: 10 INJECTION, SOLUTION EPIDURAL; INFILTRATION; INTRACAUDAL; PERINEURAL at 13:48:00

## 2024-03-12 RX ADMIN — SODIUM CHLORIDE, SODIUM LACTATE, POTASSIUM CHLORIDE, CALCIUM CHLORIDE: 600; 310; 30; 20 INJECTION, SOLUTION INTRAVENOUS at 14:10:00

## 2024-03-12 NOTE — H&P
History & Physical Examination    Patient Name: Meagan Nolen  MRN: [id number]  CSN: [id number]  YOB: 1955    Diagnosis: BARRETTS ESOPHAGUS WITH LOW GRADE DYSPLASIA      Present Illness:  Meagan Nolen is a 69 year old female is here BARRETTS ESOPHAGUS WITH LOW GRADE DYSPLASIA.    Body mass index is 21.8 kg/m².    Past Medical History:   Diagnosis Date    Kearns's esophagus with esophagitis     Colon polyp     Esophageal reflux     IBS     Insomnia 02/29/2012    Osteoporosis     Visual impairment     glasses       Procedure: EGD RFA    Physician Pre-Sedation Assessment    Pre-Sedation Assessment:    Sedation History: Airway Assessed    ASA Classification: 2. Patient with mild systemic disease    Cardiac:    Respiratory:    Abdomen:      Plan: MAC        Current Facility-Administered Medications   Medication Dose Route Frequency    lactated ringers infusion   Intravenous Continuous       Allergies: No Known Allergies    Past Surgical History:   Procedure Laterality Date    COLONOSCOPY  '05 repeat in 8-10 yrs    COLONOSCOPY  5/18/05    COLONOSCOPY N/A 12/15/2015    Procedure: COLONOSCOPY;  Surgeon: Spencer Rodriguez MD;  Location:  ENDOSCOPY    COLONOSCOPY  06/24/2020    Repeat in 7 years    COLONOSCOPY & POLYPECTOMY  12/15/15= Hemorrhoids, Polyp (TA)    Repeat 2020    EGD N/A 6/24/2020    Procedure: ESOPHAGOGASTRODUODENOSCOPY, COLONOSCOPY, POSSIBLE BIOPSY, POSSIBLE POLYPECTOMY 50003, 55116;  Surgeon: Seth Alaniz MD;  [facility] [facility]    IMPLANT LEFT Left 1998    Saline    IMPLANT RIGHT Right 1998    Saline     Family History   Problem Relation Age of Onset    Gastro-Intestinal Disorder Father         diverticulitis    Stroke Mother     Breast Cancer Paternal Grandmother 80        age of onset dx 80s     Social History     Tobacco Use    Smoking status: Never    Smokeless tobacco: Never   Substance Use Topics    Alcohol use: Yes     Alcohol/week: 4.0 standard drinks  of alcohol     Types: 4 Glasses of wine per week     Comment: 3-4 glasses weekly       SYSTEM Check if Review is Normal Check if Physical Exam is Normal If not normal, please explain:   HEENT [x ] [x ]    NECK & BACK [x ] [x ]    HEART [x ] [x ]    LUNGS [x ] [x ]    ABDOMEN [x ] [x ]    UROGENITAL [ ] [ ]    EXTREMITIES [ ] [ ]    OTHER        [ x ] I have discussed the risks and benefits and alternatives with the patient/family.  They understand and agree to proceed with plan of care.  [ x ] I have reviewed the History and Physical done within the last 30 days.  Any changes noted above.    Florencio Rios MD  3/12/2024  1:47 PM

## 2024-03-12 NOTE — ANESTHESIA POSTPROCEDURE EVALUATION
[facility]    Meagan Nolen Patient Status:  Hospital Outpatient Surgery   Age/Gender 69 year old female MRN [id number]   Location [facility] ENDOSCOPY PAIN CENTER Attending Florencio Rios MD   Hosp Day # 0 PCP Fransisco Esquivel DO       Anesthesia Post-op Note    ESOPHAGOGASTRODUODENOSCOPY   WITH RADIO  FREQUENCY ABLATION    Procedure Summary       Date: 03/12/24 Room / Location:  ENDOSCOPY 01 /  ENDOSCOPY    Anesthesia Start: 1345 Anesthesia Stop: 1410    Procedure: ESOPHAGOGASTRODUODENOSCOPY  WITH RADIO  FREQUENCY ABLATION Diagnosis: (barretts esophagus, hiatal hernia)    Surgeons: Florencio Rios MD Anesthesiologist: Harris Beltrán MD    Anesthesia Type: MAC ASA Status: 2            Anesthesia Type: MAC    Vitals Value Taken Time   /66 03/12/24 1413   Temp  03/12/24 1416   Pulse 61 03/12/24 1416   Resp 14 03/12/24 1408   SpO2 99 % 03/12/24 1416   Vitals shown include unfiled device data.    Patient Location: Endoscopy    Anesthesia Type: MAC    Airway Patency: patent    Postop Pain Control: adequate    Mental Status: preanesthetic baseline    Nausea/Vomiting: none    Cardiopulmonary/Hydration status: stable euvolemic    Complications: no apparent anesthesia related complications    Postop vital signs: stable    Dental Exam: Unchanged from Preop    Patient to be discharged home when criteria met.

## 2024-03-12 NOTE — OPERATIVE REPORT
OPERATIVE REPORT   PATIENT NAME: Meagan Nolen  MRN: [id number]  DATE OF OPERATION: 3/12/2024  PREOPERATIVE DIAGNOSIS: Esquivel's esophagus with low grade dysplasia; chronic GERD  POSTOPERATIVE DIAGNOSIS:                1.  Irregular Z line with small < 1cm tongue of Esquivel's at 12 o'clock                2.  Small 2cm Hill type II hiatal hernia                3.  Mild erosive esophagitis  PROCEDURE PERFORMED: Esophagogastroduodenoscopy with david RFA #3/3  SEDATION MEDICATIONS: MAC  MODERATE SEDATION TIME: None.  Deep sedation provided by anesthesia  PREPROCEDURE ASSESSMENT: The indication for this procedure is to assess for Esquivel's and RFA. The patient was identified by myself and nursing staff in the exam room. Informed consent was obtained. The patient was seen in clinic and a full H&P was obtained. On brief physical examination, airway is patent. Chest is clear. Heart has regular rate and rhythm. Abdomen is soft, nontender with good bowel sounds. A medication list was taken by nursing today and reviewed by myself. The patient is an ASA grade 2.   PROCEDURE NOTE: The procedure was completed without difficulty. The patient tolerated the procedure well. The endoscope was inserted through the mouth and advanced to the level of the duodenum, 3rd portion.  Esophagus appeared normal without stricture but small erosion was noted. Small hiatal hernia was noted. Irregular Z line with small tongue noted. Circumferential RFA with channel catheter was performed x 2 sessions.  Good results were noted.  Stomach was normal in the antrum and the body.  Duodenum was normal in the bulb and 2nd duodenum.  Retroflexed view in the stomach revealed normal fundus and cardia.  There were no immediate complications.   FINDINGS   Ultra-short esquivel's with LGD.  S/p RFA x 3 of 3  RECOMMENDATIONS: repeat EGD in 3 months in office location;  continue PPI  DISCHARGE:  On discharge, the patient was given an after-visit summary  detailing the procedure, findings, followup plans, and an updated medication list.      Thank you very much for the consultation.  I really appreciate it.     Florencio Rios MD

## 2024-03-12 NOTE — ANESTHESIA PREPROCEDURE EVALUATION
PRE-OP EVALUATION    Patient Name: Meagan Nolen    Admit Diagnosis: BARRETTS ESOPHAGUS WITH LOW GRADE DYSPLASIA    Pre-op Diagnosis: BARRETTS ESOPHAGUS WITH LOW GRADE DYSPLASIA    ESOPHAGOGASTRODUODENOSCOPY   WITH RADIO  FREQUENCY ABLATION    Anesthesia Procedure: ESOPHAGOGASTRODUODENOSCOPY  WITH RADIO  FREQUENCY ABLATION    Surgeon(s) and Role:     * Florencio Rios MD - Primary    Pre-op vitals reviewed.  Temp: 97.2 °F (36.2 °C)  Pulse: 62  Resp: 18  BP: 129/74  SpO2: 97 %  Body mass index is 21.8 kg/m².    Current medications reviewed.  Hospital Medications:  • lactated ringers infusion   Intravenous Continuous       Outpatient Medications:     Medications Prior to Admission   Medication Sig Dispense Refill Last Dose   • omeprazole 20 MG Oral Capsule Delayed Release Take 1 capsule (20 mg total) by mouth every morning before breakfast.   3/11/2024   • famotidine 20 MG Oral Tab Take 1 tablet (20 mg total) by mouth every evening.   3/11/2024   • IRON OR daily.   3/11/2024   • ALENDRONATE 70 MG Oral Tab TAKE 1 TABLET BY MOUTH 1 TIME A WEEK (Patient taking differently: On Saturdays) 12 tablet 0 3/9/2024   • Cyanocobalamin (VITAMIN B-12 OR) Take by mouth daily.   3/11/2024   • cholecalciferol (VITAMIN D3) 125 MCG (5000 UT) Oral Cap Take 1 capsule (5,000 Units total) by mouth daily.   3/11/2024   • Calcium Carbonate 600 MG Oral Tab 1 tablet (600 mg total) daily.   3/11/2024   • Probiotic Product (PROBIOTIC DAILY OR) Take by mouth.   3/11/2024       Allergies: Patient has no known allergies.      Anesthesia Evaluation    Patient summary reviewed.    Anesthetic Complications  (-) history of anesthetic complications         GI/Hepatic/Renal      (+) GERD                           Cardiovascular        Exercise tolerance: good     MET: >4                                           Endo/Other    Negative endo/other ROS.                              Pulmonary    Negative pulmonary ROS.                        Neuro/Psych    Negative neuro/psych ROS.                                Past Surgical History:   Procedure Laterality Date   • COLONOSCOPY  '05 repeat in 8-10 yrs   • COLONOSCOPY  5/18/05   • COLONOSCOPY N/A 12/15/2015    Procedure: COLONOSCOPY;  Surgeon: Spencer Rodriguez MD;  Location:  ENDOSCOPY   • COLONOSCOPY  06/24/2020    Repeat in 7 years   • COLONOSCOPY & POLYPECTOMY  12/15/15= Hemorrhoids, Polyp (TA)    Repeat 2020   • EGD N/A 6/24/2020    Procedure: ESOPHAGOGASTRODUODENOSCOPY, COLONOSCOPY, POSSIBLE BIOPSY, POSSIBLE POLYPECTOMY 24693, 31549;  Surgeon: Seth Alaniz MD;  [place] [facility]   • IMPLANT LEFT Left 1998    Saline   • IMPLANT RIGHT Right 1998    Saline     Social History     Socioeconomic History   • Marital status:    Tobacco Use   • Smoking status: Never   • Smokeless tobacco: Never   Vaping Use   • Vaping Use: Never used   Substance and Sexual Activity   • Alcohol use: Yes     Alcohol/week: 4.0 standard drinks of alcohol     Types: 4 Glasses of wine per week     Comment: 3-4 glasses weekly   • Drug use: No     History   Drug Use No     Available pre-op labs reviewed.               Airway      Mallampati: II  Mouth opening: 3 FB  TM distance: 4 - 6 cm  Neck ROM: full Cardiovascular    Cardiovascular exam normal.  Rhythm: regular  Rate: normal  (-) murmur   Dental    Dentition appears grossly intact         Pulmonary    Pulmonary exam normal.  Breath sounds clear to auscultation bilaterally.               Other findings        ASA: 2   Plan: MAC  NPO status verified and patient meets guidelines.        Comment: MAC discussed.  All questions answered.  Patient expressed understanding and agrees to proceed.    Plan/risks discussed with: patient            Present on Admission:  **None**

## 2024-03-12 NOTE — DISCHARGE INSTRUCTIONS
Home Care Instructions for Gastroscopy with Ablation of Kearns's Esophagus with Sedation    Diet:  Soft diet for ___3__ days.   Start with light meals to minimize bloating.  Do not drink alcohol today.    Example of Soft Food Diet (foods should be consumed at room temperature):  All beverages including milk and fruit juices  White bread  Plain crackers (Saltines)  Refined, cooked, or ready to eat cereals such as cream of wheat, farina, puffed rice  Milk cheeses such as cottage cheese and American cheese  Eggs (avoid raw or fried eggs)  Cooked, fresh, or frozen fish without bones, canned tuna, salmon  Creamy peanut butter  White rice, pasta, potatoes (white and sweet - not fried)  Cooked vegetables such as carrots, green beans, spinach, etc. (avoid raw vegetables)      Medication:  If you have questions about resuming your normal medications, please contact your Primary Care Physician.  You will be provided with several prescriptions to make you as comfortable as possible  It is important to continue a strict and long-term regimen of anti-secretory medication after this treatment, such as Nexium, Prevacid, or similar drug as prescribed by your physician.      Activities:  - Take it easy today. Do not return to work today.  - Do not drive today.  - Do not operate any machinery today (including kitchen equipment).    What to Expect:  Chest discomfort  A sore throat  Difficulty or pain with swallowing  Nausea or vomiting  Symptoms should improve each day    Contact Your Doctor If:  You have difficulty breathing  You have significant chest pain  You have significant difficulty with swallowing  You have a fever greater than 100°F  You have bleeding, excessive vomiting, abdominal pain        **If unable to reach your doctor, please go to the [facility] Emergency Room**    - Your referring physician will receive a full report of your examination.  - If you do not hear from your doctor's office within two weeks of your  biopsy, please call them for your results.    You may be able to see your laboratory results in [employer] between 4 and 7 business days.  In some cases, your physician may not have viewed the results before they are released to [employer].  If you have questions regarding your results contact the physician who ordered the test/exam by phone or via [employer] by choosing \"Ask a Medical Question.\"

## (undated) DEVICE — 1200CC GUARDIAN II: Brand: GUARDIAN

## (undated) DEVICE — STERIS KITS

## (undated) DEVICE — KIT VLV 5 PC AIR H2O SUCT BX ENDOGATOR CONN

## (undated) DEVICE — KIT CUSTOM ENDOPROCEDURE STERIS

## (undated) DEVICE — 10FT COMBINED O2 DELIVERY/CO2 MONITORING. FILTER WITH MICROSTREAM TYPE LUER: Brand: DUAL ADULT NASAL CANNULA

## (undated) DEVICE — BITEBLOCK ENDOSCP 60FR MAXI STRP

## (undated) DEVICE — 3M™ RED DOT™ MONITORING ELECTRODE WITH FOAM TAPE AND STICKY GEL, 50/BAG, 20/CASE, 72/PLT 2570: Brand: RED DOT™

## (undated) DEVICE — RFA CLEANING CAP: Brand: BARRX

## (undated) DEVICE — GLOVE SUR 7 SENSICARE PIP WHT PWD F

## (undated) DEVICE — CHANNEL RFA ENDOSCOPIC CATHETER,ESOPHAGEAL TTS, 120 APPLICATIONS: Brand: BARRX

## (undated) DEVICE — GLOVE,SURG,SENSICARE,ALOE,LF,PF,7: Brand: MEDLINE